# Patient Record
Sex: FEMALE | Race: WHITE | NOT HISPANIC OR LATINO | URBAN - METROPOLITAN AREA
[De-identification: names, ages, dates, MRNs, and addresses within clinical notes are randomized per-mention and may not be internally consistent; named-entity substitution may affect disease eponyms.]

---

## 2017-04-15 ENCOUNTER — EMERGENCY (EMERGENCY)
Facility: HOSPITAL | Age: 37
LOS: 1 days | Discharge: HOME | End: 2017-04-15
Attending: EMERGENCY MEDICINE
Payer: COMMERCIAL

## 2017-04-15 VITALS
SYSTOLIC BLOOD PRESSURE: 117 MMHG | OXYGEN SATURATION: 100 % | HEIGHT: 65 IN | DIASTOLIC BLOOD PRESSURE: 78 MMHG | HEART RATE: 78 BPM | TEMPERATURE: 98 F | WEIGHT: 160.06 LBS | RESPIRATION RATE: 16 BRPM

## 2017-04-15 DIAGNOSIS — R05 COUGH: ICD-10-CM

## 2017-04-15 DIAGNOSIS — H57.12 OCULAR PAIN, LEFT EYE: ICD-10-CM

## 2017-04-15 PROCEDURE — 94640 AIRWAY INHALATION TREATMENT: CPT

## 2017-04-15 PROCEDURE — 99284 EMERGENCY DEPT VISIT MOD MDM: CPT

## 2017-04-15 PROCEDURE — 99284 EMERGENCY DEPT VISIT MOD MDM: CPT | Mod: 25

## 2017-04-15 RX ORDER — IPRATROPIUM/ALBUTEROL SULFATE 18-103MCG
3 AEROSOL WITH ADAPTER (GRAM) INHALATION
Qty: 0 | Refills: 0 | Status: COMPLETED | OUTPATIENT
Start: 2017-04-15 | End: 2017-04-15

## 2017-04-15 RX ORDER — PSEUDOEPHEDRINE HCL 30 MG
30 TABLET ORAL ONCE
Qty: 0 | Refills: 0 | Status: COMPLETED | OUTPATIENT
Start: 2017-04-15 | End: 2017-04-15

## 2017-04-15 RX ORDER — PSEUDOEPHEDRINE HCL 30 MG
1 TABLET ORAL
Qty: 20 | Refills: 0 | OUTPATIENT
Start: 2017-04-15 | End: 2017-04-20

## 2017-04-15 RX ORDER — ALBUTEROL 90 UG/1
2 AEROSOL, METERED ORAL
Qty: 1 | Refills: 0 | OUTPATIENT
Start: 2017-04-15 | End: 2017-05-15

## 2017-04-15 RX ADMIN — Medication 3 MILLILITER(S): at 13:52

## 2017-04-15 RX ADMIN — Medication 30 MILLIGRAM(S): at 13:52

## 2017-04-15 RX ADMIN — Medication 3 MILLILITER(S): at 14:02

## 2017-04-15 NOTE — ED PROVIDER NOTE - OBJECTIVE STATEMENT
37 y/o F pt with no significant PMHx presents to ED c/o L eye burning and redness as of last night secondary to her chronic cough. Pt reports having chronic cough x 6 months, saw PMD gave abx but with no relief of cough. Pt denies fever, chills, nausea, vomiting, diarrhea, SOB, visual changes, or any other complaints. NKDA. 35 y/o F pt with no significant PMHx presents to ED c/o L eye burning and redness as of last night secondary to her chronic cough. Pt reports having chronic cough x 6 months, saw PMD gave abx 3 different times but with no relief of cough. Had CXR in past told normal. Pt denies fever, chills, nausea, vomiting, diarrhea, SOB, visual changes, or any other complaints. NKDA.

## 2017-04-15 NOTE — ED PROVIDER NOTE - NS ED MD SCRIBE ATTENDING SCRIBE SECTIONS
DISPOSITION/HIV/PAST MEDICAL/SURGICAL/SOCIAL HISTORY/REVIEW OF SYSTEMS/VITAL SIGNS( Pullset)/HISTORY OF PRESENT ILLNESS/PHYSICAL EXAM

## 2017-04-15 NOTE — ED ADULT NURSE NOTE - OBJECTIVE STATEMENT
Pt a+ox3 w/ c/o non productive cough and intermittent SOB x 6 months w/ L eye reness x 1 day, pt denies fever, chest pain, nausea, vomiting, 5/10 pain scale.

## 2017-04-15 NOTE — ED PROVIDER NOTE - MEDICAL DECISION MAKING DETAILS
35 y/o F pt with chronic cough, suggestive of postnasal drop. Will attempt trial of nebulizer and decongestant and reassess.

## 2025-07-12 NOTE — ED ADULT NURSE NOTE - NS ED NURSE LEVEL OF CONSCIOUSNESS MENTAL STATUS
Alert/Awake Mildly to Moderately Impaired: difficulty hearing in some environments or speaker may need to increase volume or speak distinctly